# Patient Record
Sex: FEMALE | Race: WHITE
[De-identification: names, ages, dates, MRNs, and addresses within clinical notes are randomized per-mention and may not be internally consistent; named-entity substitution may affect disease eponyms.]

---

## 2019-02-08 ENCOUNTER — HOSPITAL ENCOUNTER (OUTPATIENT)
Dept: HOSPITAL 92 - LABBT | Age: 39
Discharge: HOME | End: 2019-02-08
Attending: OBSTETRICS & GYNECOLOGY
Payer: COMMERCIAL

## 2019-02-08 ENCOUNTER — HOSPITAL ENCOUNTER (INPATIENT)
Dept: HOSPITAL 92 - SURG A | Age: 39
LOS: 5 days | Discharge: HOME | DRG: 743 | End: 2019-02-13
Attending: OBSTETRICS & GYNECOLOGY | Admitting: OBSTETRICS & GYNECOLOGY
Payer: COMMERCIAL

## 2019-02-08 VITALS — BODY MASS INDEX: 37.9 KG/M2

## 2019-02-08 DIAGNOSIS — E66.9: ICD-10-CM

## 2019-02-08 DIAGNOSIS — N92.0: ICD-10-CM

## 2019-02-08 DIAGNOSIS — N81.6: ICD-10-CM

## 2019-02-08 DIAGNOSIS — N94.6: ICD-10-CM

## 2019-02-08 DIAGNOSIS — Z01.818: Primary | ICD-10-CM

## 2019-02-08 DIAGNOSIS — I10: ICD-10-CM

## 2019-02-08 DIAGNOSIS — N94.10: ICD-10-CM

## 2019-02-08 DIAGNOSIS — D25.9: ICD-10-CM

## 2019-02-08 DIAGNOSIS — Z88.0: ICD-10-CM

## 2019-02-08 DIAGNOSIS — N92.0: Primary | ICD-10-CM

## 2019-02-08 DIAGNOSIS — G47.33: ICD-10-CM

## 2019-02-08 DIAGNOSIS — F41.8: ICD-10-CM

## 2019-02-08 LAB
ANION GAP SERPL CALC-SCNC: 11 MMOL/L (ref 10–20)
BUN SERPL-MCNC: 8 MG/DL (ref 7–18.7)
CALCIUM SERPL-MCNC: 9.7 MG/DL (ref 7.8–10.44)
CHLORIDE SERPL-SCNC: 101 MMOL/L (ref 98–107)
CO2 SERPL-SCNC: 27 MMOL/L (ref 22–29)
CREAT CL PREDICTED SERPL C-G-VRATE: 0 ML/MIN (ref 70–130)
GLUCOSE SERPL-MCNC: 90 MG/DL (ref 70–105)
HGB BLD-MCNC: 13.2 G/DL (ref 12–16)
MCH RBC QN AUTO: 28.6 PG (ref 27–31)
MCV RBC AUTO: 83.1 FL (ref 78–98)
PLATELET # BLD AUTO: 256 THOU/UL (ref 130–400)
POTASSIUM SERPL-SCNC: 3.3 MMOL/L (ref 3.5–5.1)
PREGS CONTROL BACKGROUND?: (no result)
PREGS CONTROL BAR APPEAR?: YES
RBC # BLD AUTO: 4.61 MILL/UL (ref 4.2–5.4)
SODIUM SERPL-SCNC: 136 MMOL/L (ref 136–145)
WBC # BLD AUTO: 7.2 THOU/UL (ref 4.8–10.8)

## 2019-02-08 PROCEDURE — 86901 BLOOD TYPING SEROLOGIC RH(D): CPT

## 2019-02-08 PROCEDURE — 93010 ELECTROCARDIOGRAM REPORT: CPT

## 2019-02-08 PROCEDURE — 80048 BASIC METABOLIC PNL TOTAL CA: CPT

## 2019-02-08 PROCEDURE — 36415 COLL VENOUS BLD VENIPUNCTURE: CPT

## 2019-02-08 PROCEDURE — 88307 TISSUE EXAM BY PATHOLOGIST: CPT

## 2019-02-08 PROCEDURE — 86850 RBC ANTIBODY SCREEN: CPT

## 2019-02-08 PROCEDURE — 85027 COMPLETE CBC AUTOMATED: CPT

## 2019-02-08 PROCEDURE — 84703 CHORIONIC GONADOTROPIN ASSAY: CPT

## 2019-02-08 PROCEDURE — 86900 BLOOD TYPING SEROLOGIC ABO: CPT

## 2019-02-08 PROCEDURE — 93005 ELECTROCARDIOGRAM TRACING: CPT

## 2019-02-08 PROCEDURE — S0028 INJECTION, FAMOTIDINE, 20 MG: HCPCS

## 2019-02-11 NOTE — HP
DATE OF PLANNED PROCEDURE:  02/12/2019.



PROCEDURE TO BE PERFORMED:  Robotic assisted total laparoscopic hysterectomy 
with

bilateral salpingectomy and posterior colporrhaphy. 



HISTORY OF PRESENT ILLNESS:  The patient is a 38-year-old G3, P2-0-1-2 with a 
long

history of menorrhagia and dysmenorrhea as well as dyspareunia, who has tried

medical management options, declines further medical options and requests 
definitive

surgery for her concerns with hysterectomy.  The patient used oral contraceptive

pills many years ago, but does not use them since she has been diagnosed with 
high

blood pressure.  She has been offered a Mirena IUD, which she declines and also

discussed ablation with her provider, Dr. Stanley Dueñas.  The patient reports that 
her

partner had a vasectomy.  They have satisfied parity and she is tired of 
painful and

heavy menstrual cycles and requests hysterectomy.  The patient was also noted to

have a rectocele at the time of her exam with Dr. Dueñas as well as on her preop 
exam

with me and request posterior colporrhaphy at the time of hysterectomy.  The 
patient

does give a history of chronic constipation and changing positions to have a 
bowel

movement.  Understands the risks, benefits, and limitations of a posterior

colporrhaphy and is working on having more regular bowel movements with a 
healthy

diet and stool softeners. 



PAST MEDICAL HISTORY:  Sleep apnea, joint pain, high blood pressure, depression,

anxiety, and obesity. 



CURRENT MEDICATIONS:  

1. BuSpar 10 mg.

2. Celebrex 20 mg b.i.d. for pain.

3. Hydrochlorothiazide 25 mg as needed for swelling.

4. Ibuprofen 800 mg as needed..

5. Metoprolol 25 mg daily.

6. Sertraline 25 mg daily.

7. Ventolin HFA aerosol inhaler as needed.



ALLERGIES:  PENICILLIN WITH RASH.



GYNECOLOGIC HISTORY:  LMP on 01/18/2019.  Most recent Pap smear 12/12/2018, 
negative

with negative HPV.  Contraceptive method, vasectomy.  Irregular heavy painful

dyspareunia noted. 



PREVIOUS OBSTETRICAL HISTORY:  G3, P2-0-1-2 with two vaginal deliveries and one

early miscarriage. 



FAMILY HISTORY:  Significant for diabetes and hypertension.



SOCIAL HISTORY:  The patient is .  She is not a smoker.  She does not use

alcohol or drugs. 



PAST SURGICAL HISTORY:  Significant for cholecystectomy and spine surgery.



REVIEW OF SYSTEMS:  Negative except as stated above.



PHYSICAL EXAMINATION:

VITAL SIGNS:  Height 5 feet 7 inches, 242 pounds, BMI 37.4, blood pressure 148/
82,

pulse 76, respirations 18, O2 saturation 99%. 

GENERAL:  No acute distress.  Alert and oriented. 

HEENT:  Grossly normal. 

LUNGS:  Nonlabored breathing. 

ABDOMEN:  Soft, nondistended, and nontender.  No guarding.  No masses. 

:  Normal external female genitalia.  Normal vaginal mucosa with rectocele.

Prominent rectocele noted.  No cystocele noted.  Cervix, normal appearing.  No

discharge or lesions.  Uterus not enlarged.  Nontender.  No adnexal masses or

adnexal tenderness.  Bladder and urethra appeared normal.  Rectum, no perianal 
skin

lesions, fissures, or masses. 

SKIN:  No rash. 

MENTAL STATUS:  Appropriate mood and affect.



DIAGNOSTIC STUDIES:  Endometrial biopsy on 01/23/2019, benign.  Pap smear and 
HPV

negative. 



ASSESSMENT AND PLAN:  Ms. Lashay Guerrero is a 38-year-old, G3, P2-0-1-2 with a

normal sized uterus on exam and on endometrial biopsy with menometrorrhagia and

dyspareunia, has failed limited medical management, and who now requests 
definitive

treatment with a robotic assisted total laparoscopic hysterectomy and bilateral

salpingectomy.  The patient has had a transvaginal ultrasound with no adnexal 
masses

seen and a normal-appearing uterus.  We discussed the risks and benefits of 
surgery.

 She is aware that the risks are to include, but not limited to, bleeding,

infection, damage to intraabdominal pelvic organs, possible need for future 
medical

and/or surgical management, inability to fully dose and possible inability to 
fully

diagnose treat all conditions at the time of surgery.  She is also aware of the 
risk

for posterior colporrhaphy that include the above as well as the risk of 
recurrence

and the risk for dyspareunia after repair.  The patient has also been counseled 
on

postoperative medication use as well as the opportunity to have an ON-Q nerve 
block

pump placed at the time of surgery, which she is considering at this time.  The

patient and her 's questions have been reviewed in detail and she 
desires to

proceed with the procedure as listed above.  The patient is scheduled to 
undergo a

robotic assisted total laparoscopic hysterectomy with bilateral salpingectomy 
and

posterior colporrhaphy. 







Job ID:  [id number]



[facility]

## 2019-02-12 PROCEDURE — 0UT74ZZ RESECTION OF BILATERAL FALLOPIAN TUBES, PERCUTANEOUS ENDOSCOPIC APPROACH: ICD-10-PCS | Performed by: OBSTETRICS & GYNECOLOGY

## 2019-02-12 PROCEDURE — 0UT94ZZ RESECTION OF UTERUS, PERCUTANEOUS ENDOSCOPIC APPROACH: ICD-10-PCS | Performed by: OBSTETRICS & GYNECOLOGY

## 2019-02-12 PROCEDURE — 0JQC0ZZ REPAIR PELVIC REGION SUBCUTANEOUS TISSUE AND FASCIA, OPEN APPROACH: ICD-10-PCS | Performed by: OBSTETRICS & GYNECOLOGY

## 2019-02-12 PROCEDURE — 8E0W4CZ ROBOTIC ASSISTED PROCEDURE OF TRUNK REGION, PERCUTANEOUS ENDOSCOPIC APPROACH: ICD-10-PCS | Performed by: OBSTETRICS & GYNECOLOGY

## 2019-02-12 RX ADMIN — ONDANSETRON PRN MG: 2 INJECTION INTRAMUSCULAR; INTRAVENOUS at 14:13

## 2019-02-12 RX ADMIN — SIMETHICONE PRN MG: 80 TABLET, CHEWABLE ORAL at 23:27

## 2019-02-12 RX ADMIN — ONDANSETRON PRN MG: 2 INJECTION INTRAMUSCULAR; INTRAVENOUS at 18:30

## 2019-02-12 NOTE — PDOC.EVN
Event Note





- Event Note


Event Note: 





POD0





Resting comfortably.  Tolerating CLD.  No concerns. 





NAD A and O


Nonlabored breathing


Abd soft


UOP 1300ml since 





Procedure reviewed and post op medication use reviewed.

## 2019-02-13 VITALS — TEMPERATURE: 98.2 F | SYSTOLIC BLOOD PRESSURE: 115 MMHG | DIASTOLIC BLOOD PRESSURE: 57 MMHG

## 2019-02-13 LAB
HGB BLD-MCNC: 10.7 G/DL (ref 12–16)
MCH RBC QN AUTO: 28.3 PG (ref 27–31)
MCV RBC AUTO: 85.2 FL (ref 78–98)
PLATELET # BLD AUTO: 202 THOU/UL (ref 130–400)
RBC # BLD AUTO: 3.77 MILL/UL (ref 4.2–5.4)
WBC # BLD AUTO: 6.7 THOU/UL (ref 4.8–10.8)

## 2019-02-13 RX ADMIN — SIMETHICONE PRN MG: 80 TABLET, CHEWABLE ORAL at 08:45

## 2019-02-13 NOTE — EKG
Test Reason : 

Blood Pressure : ***/*** mmHG

Vent. Rate : 066 BPM     Atrial Rate : 066 BPM

   P-R Int : 176 ms          QRS Dur : 094 ms

    QT Int : 410 ms       P-R-T Axes : 038 077 017 degrees

   QTc Int : 429 ms

 

Normal sinus rhythm

Normal ECG

No previous ECGs available

Confirmed by DR. RICARDO PRESLEY ([id number]) on 2/13/2019 12:44:02 PM

 

Referred By:  JO           Confirmed By:DR. RICARDO PRESLEY

## 2019-02-13 NOTE — PDOC.EVN
Event Note





- Event Note


Event Note: 





DISCHARGE NOTE





HD1





S: Pt rested well overnight, some bloating but just started passing gas, echols 

out, has not ambulated to RR yet, tolerating diet. 





O: VS WNL


NAD A and O


Abd: nondistended, appropriate tenderness, inc CDI x 4


Qi: dry


Ext: no edema


Psy: appropriate mood





Path pending


 Vital Signs (24 hours)











  Temp Pulse Resp BP Pulse Ox


 


 02/13/19 08:23  98.2 F  59 L  20  115/57 L  96


 


 02/13/19 04:50  98 F  60  16  102/57 L  93 L


 


 02/12/19 23:20  98.4 F  69  16  96/55 L  92 L


 


 02/12/19 19:40  98.6 F  83  18  112/62  91 L


 


 02/12/19 15:45  98.0 F  75  18  128/66  97


 


 02/12/19 14:29  98.1 F  78  19  120/67  93 L


 


 02/12/19 13:53  97.4 F L  68  20  118/66  98


 


 02/12/19 12:40  98.2 F  69  20  110/58 L  97


 


 02/12/19 12:08      98


 


 02/12/19 12:01  98.9 F  63  19  118/62  98


 


 02/12/19 11:21  97.9 F  56 L  19  111/63  96


 


 02/12/19 11:02  98.0 F  69  18  129/61  98








 Laboratory Results - last 24 hr











  02/13/19





  05:46


 


WBC  6.7


 


RBC  3.77 L


 


Hgb  10.7 L


 


Hct  32.2 L


 


MCV  85.2


 


MCH  28.3


 


MCHC  33.3


 


RDW  12.5


 


Plt Count  202


 


MPV  7.8








A/P: POD 1 doing well, plan for discharge home later today.  Post op 

instructions and pain med use reviewed.